# Patient Record
Sex: MALE | Race: BLACK OR AFRICAN AMERICAN
[De-identification: names, ages, dates, MRNs, and addresses within clinical notes are randomized per-mention and may not be internally consistent; named-entity substitution may affect disease eponyms.]

---

## 2017-02-04 NOTE — EMERGENCY ROOM REPORT
History of Present Illness


General


Chief Complaint:  Pain


Source:  Patient





Present Illness


HPI


Patient is a 56-year-old male who presented after having increased pain to his 

left arm as well as his chest.  Patient had gradual onset of symptoms.  Patient 

had no recent trauma.  Patient reports having increased numbness and weakness 

to his left upper extremity.  Patient denied prior chest pain the pain had been 

constant and worse with movements.  Patient was noted to have a burning type 

sensation to his upper extremities which did not go to his hands.


Allergies:  


Coded Allergies:  


     No Known Allergies (Unverified , 5/22/14)





Patient History


Past Medical History:  see triage record


Reviewed Nursing Documentation:  PMH: Agreed, PSxH: Agreed





Nursing Documentation-PMH


Hx Hypertension:  Yes





Review of Systems


All Other Systems:  negative except mentioned in HPI





Physical Exam





Vital Signs








  Date Time  Temp Pulse Resp B/P Pulse Ox O2 Delivery O2 Flow Rate FiO2


 


2/4/17 15:45 97.9 61 17 135/84 94 Room Air  








Sp02 EP Interpretation:  reviewed, normal


General Appearance:  normal inspection, well appearing, no apparent distress, 

alert, GCS 15, non-toxic


Head:  atraumatic


ENT:  normal ENT inspection, hearing grossly normal, normal voice


Neck:  normal inspection, supple, no bony tend, limited range of motion, tender 

lateral - increased pain with left rotation and compression


Respiratory:  normal inspection, lungs clear, normal breath sounds, no 

respiratory distress, no retraction, no wheezing


Cardiovascular #1:  regular rate, rhythm, no edema


Gastrointestinal:  normal inspection, normal bowel sounds, non tender, soft, no 

guarding, no hernia


Genitourinary:  no CVA tenderness


Musculoskeletal:  normal inspection, back normal, normal range of motion, other 

- limited ROM in extension, increased pain with left rotation


Neurologic:  normal inspection, alert, oriented x3, responsive, CNs III-XII nml 

as tested, motor strength/tone normal, DTRs symmetric, speech normal, sensory 

deficit


Psychiatric:  normal inspection, judgement/insight normal, mood/affect normal


Skin:  normal inspection, normal color, no rash





Medical Decision Making


Diagnostic Impression:  


 Primary Impression:  


 Radiculopathy of cervical spine


 Additional Impressions:  


 LVH (left ventricular hypertrophy)


 Abnormal EKG


ER Course


Patient presented for neck pain.Differential diagnosis included vertebral 

artery dissection, myocardial infarction, cervical fracture, arthritis, 

spondylolithises, pulmonary embolism.  


Because of complexity of patient's case laboratory testing and imaging studies 

were ordered.


A CT imaging of the cervical spine was ordered the patient's symptomatology.CT 

of chest was ordered due to patient's elevated d-dimer and the risk factors 

including prolonged driving as well as the abnormal EKG.  The patient was noted 

to have a lateral and inferior T wave inversion.  Patient states that he has 

had a cardiac catheterization approximately one year ago which showed no 

evidence of acute coronary blockage.  EKG interpreted by me showed normal sinus 

rhythm with a rate of 65 there is T-wave inversion as well as inferior T-wave 

inversion as well as left ventricular hypertrophy.   A CT of the chest read by 

radiologist showed no acute pulmonary embolism.  Patient was noted to have some 

degenerative changes cervical spine CT.  The patient is also noted to have some 

thyromegaly.  Patient was advised of CT findings and was advised followup with 

a spine specialist.  The patient is advised to follow up with  primary care 

doctor in 1-2 days.  Patient is advised to return if any worsening condition or 

if any changes in status that are concerning.





Labs








Test


  2/4/17


16:11


 


White Blood Count


  10.4 K/UL


(4.8-10.8)


 


Red Blood Count


  5.14 M/UL


(4.70-6.10)


 


Hemoglobin


  15.5 G/DL


(14.2-18.0)


 


Hematocrit


  49.2 %


(42.0-52.0)


 


Mean Corpuscular Volume 96 FL (80-99) 


 


Mean Corpuscular Hemoglobin


  30.2 PG


(27.0-31.0)


 


Mean Corpuscular Hemoglobin


Concent 31.6 G/DL


(32.0-36.0)


 


Red Cell Distribution Width


  12.8 %


(11.6-14.8)


 


Platelet Count


  195 K/UL


(150-450)


 


Mean Platelet Volume


  7.5 FL


(6.5-10.1)


 


Neutrophils (%) (Auto)


  52.4 %


(45.0-75.0)


 


Lymphocytes (%) (Auto)


  36.7 %


(20.0-45.0)


 


Monocytes (%) (Auto)


  8.2 %


(1.0-10.0)


 


Eosinophils (%) (Auto)


  1.9 %


(0.0-3.0)


 


Basophils (%) (Auto)


  0.9 %


(0.0-2.0)


 


D-Dimer


  518 ng/mL


(<500)


 


Sodium Level


  142 mEQ/L


(135-145)


 


Potassium Level


  4.0 mEQ/L


(3.4-4.9)


 


Chloride Level


  98 mEQ/L


()


 


Carbon Dioxide Level


  25 mEQ/L


(20-30)


 


Anion Gap 19 (5-15) 


 


Blood Urea Nitrogen


  13 mg/dL


(7-23)


 


Creatinine


  1.0 mg/dL


(0.7-1.2)


 


Estimat Glomerular Filtration


Rate > 60 mL/min


(>60)


 


Glucose Level


  100 mg/dL


()


 


Calcium Level


  10.3 mg/dL


(8.6-10.2)


 


Total Bilirubin


  0.5 mg/dL


(0.0-1.2)


 


Aspartate Amino Transf


(AST/SGOT) 22 U/L (5-40) 


 


 


Alanine Aminotransferase


(ALT/SGPT) 28 U/L (3-41) 


 


 


Alkaline Phosphatase


  102 U/L


()


 


Total Creatine Kinase


  430 U/L


()


 


Creatine Kinase MB


  3.1 ng/mL (<


6.7)


 


Creatine Kinase MB Relative


Index 0.7 


 


 


Troponin I


  < 0.30 ng/mL


(<=0.30)


 


Total Protein


  8.0 g/dL


(6.6-8.7)


 


Albumin


  4.3 g/dL


(3.5-5.2)


 


Globulin 3.7 g/dL 


 


Albumin/Globulin Ratio 1.1 (1.0-2.7) 








Chest X-Ray Diagnostic Results


EP Interpretation:  Yes


Findings:  no consolidation, no effusion, no pneumothorax, no acute 

cardiopulmonary disease


Number of Views:  1





Last Vital Signs








  Date Time  Temp Pulse Resp B/P Pulse Ox O2 Delivery O2 Flow Rate FiO2


 


2/4/17 15:45 97.9 61 17 135/84 94 Room Air  








Status:  improved


Disposition:  HOME, SELF-CARE


Condition:  Stable


Scripts


Gabapentin* (NEURONTIN*) 400 Mg Capsule


400 MG ORAL THREE TIMES A DAY, #30 CAP 0 Refills


   Prov: Kalen Benson         2/4/17











Kalen Benson Feb 4, 2017 16:09

## 2017-02-05 NOTE — DIAGNOSTIC IMAGING REPORT
HISTORY:  Neck pain.\H\



\N\COMPARISON:  None.

\H\

\N\TECHNIQUE: \H\ \N\Serial axial images were obtained through the cervical spine

without the use of intravenous contrast on a multidetector CT.  Multiplanar

reformatted images were then obtained.



FINDINGS:

Mild disc space height loss is noted at C5-C6 and C6-C7 levels. Vertebral body

heights and remaining disc spaces are preserved.  The lateral masses of C1 and C2

are well aligned.  The AA interval is normal.  There is no prevertebral or

paraspinal soft tissue swelling or other evidence of acute fracture, subluxation,

jumped facet, significant arthritic change or destructive bony lesion. Mild 

cervical

spondylosis with multilevel uncovertebral and facet arthropathy resulting in some

degree of neural foraminal stenosis, particularly at C3-C4 through C6-C7 levels

noted.



Opacification of the right sphenoid sinus is noted. Visualized thyroid gland 

appears

enlarged and heterogeneous.



IMPRESSION:



1. No acute cervical spine fracture or malalignment.

2. Mild cervical spondylosis.

3. Right sphenoid sinus opacification.

4. Thyromegaly. Please correlate with endocrinologic evaluation and thyroid 

function

tests.

## 2017-02-05 NOTE — DIAGNOSTIC IMAGING REPORT
History: Pain.



Technique: Frontal, lateral, and open-mouth odontoid views of the cervical spine 

are

provided.



Comparison: None.



Findings:

Overall cervical alignment is maintained.  Vertebral body and disk space heights 

are

preserved.  The lateral masses of C1 and C2 are well aligned.  The atlantoaxial

interval is normal.  There is no prevertebral soft tissue swelling or other evidence

of acute fracture.



Impression:

No radiographic evidence of acute displaced cervical spine fracture.

## 2017-02-05 NOTE — DIAGNOSTIC IMAGING REPORT
Clinical history: Acute chest of breath.



Technique: Portable AP chest radiograph was obtained.



Comparison: 5/22/14.



Findings:



The lungs are well inflated and clear. There is no pneumonia or pulmonary edema.

There is no pleural effusion or pneumothorax.  The cardiac and mediastinal

silhouettes are normal in appearance.  The bony thorax is unremarkable.



Impression:



No radiographic evidence of acute cardiopulmonary process..

## 2017-02-05 NOTE — CARDIOLOGY REPORT
--------------- APPROVED REPORT --------------





EKG Measurement

Heart [id number]

MT [id number]

[id number]

[id number]

[id number]





Normal sinus rhythm



Abnormal ECG

## 2017-02-07 NOTE — DIAGNOSTIC IMAGING REPORT
EXAM: CT Chest



HISTORY: Abnormal labs. Chest pain.



TECHNIQUE: Serial 3.0 mm axial images were obtained from the thoracic inlet through

the adrenals after administration of 120 cc of Isovue 370 . Multiplanar reformats

were performed.



COMPARISON: No prior study is available for comparison.



FINDINGS:



Mild subsegmental atelectasis or scarring is noted at the right lung base. No lobar

or segmental consolidation noted. No pleural effusion is seen. Secretions are noted

in the lower trachea and right mainstem bronchus.



The heart is not enlarged. There is no evidence of pericardial effusion. There is 

no

evidence of mediastinal, hilar or axillary adenopathy. The thyroid appears

unremarkable.



There is adequate opacification of the pulmonary arteries to the segmental level.

The subsegmental pulmonary arteries are not optimally evaluated. Within this

confine, no acute pulmonary embolism identified. Thoracic aorta and great vessels

are normal in caliber.



The visualized upper abdominal organs appear grossly normal.



Chronic fracture deformity of the left posterior fifth and sixth ribs noted.



IMPRESSION:



1. No distinct evidence of acute pulmonary embolism.

2. No evidence of pneumonia. Suspected right basilar atelectasis or scarring.

3. Chronic fracture deformity of the left posterior fifth and sixth ribs.

## 2018-07-30 NOTE — PRE-OP HX & PHY REPO 2 SIG
DATE OF ADMISSION:  08/01/2018



ANTICIPATED DATE OF SURGERY:  08/01/2018



HISTORY OF PRESENT ILLNESS:  The patient is a 58-year-old gentleman who I

initially evaluated in 12/2015, with symptomatic posterior vitreous

detachment and epiretinal membrane in his left eye.  He underwent a pars

plana vitrectomy with membrane peel at [facility] on May 23, 2018.

He has done well postoperatively.  He is now being scheduled for pars

plana vitrectomy for the right eye for symptomatic vitreous opacities.



PAST MEDICAL HISTORY:  Marked for hypercholesteremia, hypertension,

arthritis, asthma.  He is status post hand surgery and previous

vitrectomy.



MEDICATIONS:  Include Crestor, hydrochlorothiazide, amlodipine,

valsartan.



ALLERGIES:  He has no known drug allergies.



SOCIAL HISTORY:  Remarkable for occasional alcohol use.  He does not use

tobacco.



OPHTHALMIC EXAMINATION:  Visual acuity 20/20 right eye.  Tension is 18 in

the right and 17 in the left eye.  Slit-lamp demonstrated the conjunctiva

is quiet.  The cornea is clear.  The anterior chambers are quiet with 1+

nuclear sclerosis of the lenses of both eyes.  Dilated vitreoretinal

examination of right eye demonstrates clear media with cup-to-disc ratio

of 0.3.  There is a posterior vitreous detachment with opacities present

with some _____ and has flattened periphery.  Left eye media is clear,

status post vitrectomy.  There is mild thickening of macula, status post

recent membrane peel _____ flattened periphery.



IMPRESSION:  Symptomatic vitreous opacities in the right eye and posterior

vitreous detachment, right eye.



I discussed my findings with the patient.  He is having difficulties

performing his job duties as a  due to the opacities.

He states that this is causing him to feel unsafe at work and for this

reason, I will go ahead and recommend vitrectomy with possible membrane

peel to the right eye.  Complications such as hemorrhage, infection, loss

of vision, loss of eye, ptosis, glaucoma, strabismus, failure to obtain

desired results, cataract, dislocated lens, retinal detachment were all

discussed.  He understands and agrees to the surgery,.









  ______________________________________________

  Taras Germain M.D.





DR:  Abraham

D:  07/30/2018 08:16

T:  07/30/2018 10:21

JOB#:  [id number]

CC:

## 2018-08-01 ENCOUNTER — HOSPITAL ENCOUNTER (OUTPATIENT)
Dept: HOSPITAL 72 - SUR | Age: 58
Discharge: HOME | End: 2018-08-01
Payer: COMMERCIAL

## 2018-08-01 VITALS — SYSTOLIC BLOOD PRESSURE: 160 MMHG | DIASTOLIC BLOOD PRESSURE: 90 MMHG

## 2018-08-01 VITALS — SYSTOLIC BLOOD PRESSURE: 154 MMHG | DIASTOLIC BLOOD PRESSURE: 85 MMHG

## 2018-08-01 VITALS — WEIGHT: 240 LBS | BODY MASS INDEX: 30.8 KG/M2 | HEIGHT: 74 IN

## 2018-08-01 VITALS — DIASTOLIC BLOOD PRESSURE: 96 MMHG | SYSTOLIC BLOOD PRESSURE: 165 MMHG

## 2018-08-01 VITALS — DIASTOLIC BLOOD PRESSURE: 92 MMHG | SYSTOLIC BLOOD PRESSURE: 153 MMHG

## 2018-08-01 VITALS — SYSTOLIC BLOOD PRESSURE: 166 MMHG | DIASTOLIC BLOOD PRESSURE: 101 MMHG

## 2018-08-01 VITALS — DIASTOLIC BLOOD PRESSURE: 93 MMHG | SYSTOLIC BLOOD PRESSURE: 151 MMHG

## 2018-08-01 VITALS — DIASTOLIC BLOOD PRESSURE: 95 MMHG | SYSTOLIC BLOOD PRESSURE: 151 MMHG

## 2018-08-01 VITALS — SYSTOLIC BLOOD PRESSURE: 133 MMHG | DIASTOLIC BLOOD PRESSURE: 85 MMHG

## 2018-08-01 VITALS — DIASTOLIC BLOOD PRESSURE: 92 MMHG | SYSTOLIC BLOOD PRESSURE: 156 MMHG

## 2018-08-01 DIAGNOSIS — E66.9: ICD-10-CM

## 2018-08-01 DIAGNOSIS — E78.00: ICD-10-CM

## 2018-08-01 DIAGNOSIS — M19.90: ICD-10-CM

## 2018-08-01 DIAGNOSIS — J45.909: ICD-10-CM

## 2018-08-01 DIAGNOSIS — G47.33: ICD-10-CM

## 2018-08-01 DIAGNOSIS — I10: ICD-10-CM

## 2018-08-01 DIAGNOSIS — H43.391: Primary | ICD-10-CM

## 2018-08-01 PROCEDURE — 94150 VITAL CAPACITY TEST: CPT

## 2018-08-01 PROCEDURE — 94003 VENT MGMT INPAT SUBQ DAY: CPT

## 2018-08-01 PROCEDURE — 67036 REMOVAL OF INNER EYE FLUID: CPT

## 2018-08-01 RX ADMIN — Medication SCH DROP: at 10:46

## 2018-08-01 RX ADMIN — CYCLOPENTOLATE HYDROCHLORIDE SCH DROP: 10 SOLUTION OPHTHALMIC at 10:54

## 2018-08-01 RX ADMIN — PHENYLEPHRINE HYDROCHLORIDE SCH DROP: 25 SOLUTION/ DROPS OPHTHALMIC at 10:37

## 2018-08-01 RX ADMIN — CYCLOPENTOLATE HYDROCHLORIDE SCH DROP: 10 SOLUTION OPHTHALMIC at 10:46

## 2018-08-01 RX ADMIN — PHENYLEPHRINE HYDROCHLORIDE SCH DROP: 25 SOLUTION/ DROPS OPHTHALMIC at 10:54

## 2018-08-01 RX ADMIN — CYCLOPENTOLATE HYDROCHLORIDE SCH DROP: 10 SOLUTION OPHTHALMIC at 10:37

## 2018-08-01 RX ADMIN — PHENYLEPHRINE HYDROCHLORIDE SCH DROP: 25 SOLUTION/ DROPS OPHTHALMIC at 10:46

## 2018-08-01 RX ADMIN — Medication SCH DROP: at 10:37

## 2018-08-01 RX ADMIN — Medication SCH DROP: at 10:53

## 2018-08-01 NOTE — ANETHESIA PREOPERATIVE EVAL
Anesthesia Pre-op PMH/ROS


General


Date of Evaluation:  Aug 1, 2018


Anesthesiologist:  [place]


ASA Score:  ASA 2


Mallampati Score


Class I : Soft palate, uvula, fauces, pillars visible


Class II: Soft palate, uvula, fauces visible


Class III: Soft palate, base of uvula visible


Class IV: Only hard plate visible


Mallampati Classification:  Class II


Surgeon:  Marcellus


Diagnosis:  Right eye retinal detachment


Surgical Procedure:  Right eye vitrectomy


Anesthesia History:  none


Family History:  no anesthesia problems


Allergies:  


Coded Allergies:  


     No Known Allergies (Unverified , 7/31/18)


Medications:  see eMAR





Past Medical History


Cardiovascular:  Reports: HTN, other - HLD; 


   Denies: CAD, MI, valve dz, arrhythmia


Pulmonary:  Denies: asthma, COPD, AUGUST, other


Gastrointestinal/Genitourinary:  Denies: GERD, CRI, ESRD, other


Neurologic/Psychiatric:  Denies: dementia, CVA, depression/anxiety, TIA, other


Endocrine:  Denies: DM, hypothyroidism, steroids, other


HEENT:  Denies: cataract (L), cataract (R), glaucoma, [place] (L), [place] (R), other


Hematology/Immune:  Denies: anemia, DVT, bleeding disorder, other


Musculoskeletal/Integumentary:  Reports: OA; 


   Denies: RA, DJD, DDD, edema, other


Other:  obesity


PSxH Narrative:


left eye vitrectomy





Anesthesia Pre-op Phys. Exam


Physician Exam





Last Vital Signs








  Date Time  Temp Pulse Resp B/P (MAP) Pulse Ox O2 Delivery O2 Flow Rate FiO2


 


8/1/18 10:52 97.3 50 18 133/85 (101) 97   





 97.3       


 


8/1/18 10:42      Room Air  








Constitutional:  NAD


Cardiovascular:  RRR


Respiratory:  CTA





Airway Exam


Mallampati Score:  Class II


MO:  full


ROM:  full


Teeth:  intact





Anesthesia Pre-op A/P


Labs


see chart





Studies


Pre-op Studies:  EKG - sr





Risk Assessment & Plan


Assessment:


ASA II


Plan:


MAC


Status Change Before Surgery:  No





Pre-Antibiotics


Drug:  N/A











Andreea Izaguirre MD Aug 1, 2018 12:41

## 2018-08-01 NOTE — PRE-PROCEDURE NOTE/ATTESTATION
Pre-Procedure Note/Attestation


Complete Prior to Procedure


Planned Procedure:  right


Procedure Narrative:


pars plana vitrectomy





Indications for Procedure


Pre-Operative Diagnosis:


Posterior vitreous detachment with vitreous opacities





Attestation


I attest that I discussed the nature of the procedure; its benefits; risks and 

complications; and alternatives (and the risks and benefits of such alternatives

), prior to the procedure, with the patient (or the patient's legal 

representative).





I attest that, if there was a reasonable possibility of needing a blood 

transfusion, the patient (or the patient's legal representative) was given the 

[facility] standardized written summary, pursuant 

to the Markie Applegate Blood Safety Act ([place] Health and Safety Code # 1645, as 

amended).





I attest that I re-evaluated the patient just prior to the surgery and that 

there has been no change in the patient's H&P, except as documented below:











Taras Germain MD Aug 1, 2018 11:30

## 2018-08-01 NOTE — BRIEF OPERATIVE NOTE
Immediate Post Operative Note


Operative Note


Pre-op Diagnosis:


Posterior vitreous detachment with vitreous opacities


Procedure:


pars plana vitrectomy OD


Post-op Diagnosis:


same as pre op


Post-op Diagnosis:  same as pre-op


Anesthesia:  local, MAC


Specimen:  none


Complications:  none


Fluids:  o


Estimated Blood Loss:  none


Drains:  hemovac


Implant(s) used?:  No











Taras Germain MD Aug 1, 2018 13:26

## 2018-08-01 NOTE — 48 HOUR POST ANESTHESIA EVAL
Post Anesthesia Evaluation


Procedure:  Right eye vitrectomy


Date of Evaluation:  Aug 1, 2018


Airway:  patent


Nausea:  No


Vomiting:  No


Pain Intensity:  0


Hydration Status:  adequate


Cardiopulmonary Status:


at baseline


Mental Status/LOC:  patient returned to baseline


Post-Anesthesia Complications:


0


Follow-up care needed:  ready to discharge











Andreea Izaguirre MD Aug 1, 2018 13:00

## 2018-08-01 NOTE — IMMEDIATE POST-OP EVALUATION
Immediate Post-Op Evalulation


Immediate Post-Op Evalulation


Procedure:  Right eye vitrectomy


Date of Evaluation:  Aug 1, 2018


Time of Evaluation:  13:00


IV Fluids:  200


Blood Products:  0


Estimated Blood Loss:  0


Urinary Output:  0


Blood Pressure Systolic:  151


Blood Pressure Diastolic:  95


Pulse Rate:  51


Respiratory Rate:  12


O2 Sat by Pulse Oximetry:  98


Temperature (Fahrenheit):  97.4


Pain Score (1-10):  0


Nausea:  No


Vomiting:  No


Complications


0


Patient Status:  awake, reacts, patent, none


Hydration Status:  adequate


Drug:  N/A











Andreea Izaguirre MD Aug 1, 2018 13:00

## 2018-08-01 NOTE — OPERATIVE NOTE - DICTATED
DATE OF OPERATION:  08/01/2018



PREOPERATIVE DIAGNOSIS:  Vitreous opacities, right eye.



POSTOPERATIVE DIAGNOSIS:  Vitreous opacities, right eye.



PROCEDURE:  Pars plana vitrectomy, right eye.



SURGEON:  Taras Germain M.D. _____.



ANESTHESIA:  Local (4% lidocaine and 0.75% bupivacaine via retrobulbar

injection).



COMPLICATIONS:  None.



DESCRIPTION OF PROCEDURE:  After informed consent was obtained, clearance

by anesthesia, identification by Dr. Gemrain, the patient was brought to the

operating room where he received anesthetic injection.  The right eye was

prepped and draped in usual sterile ophthalmic fashion.  A wire lid

speculum was placed in conjunctiva fornix and a 3-port 27-gauge vitrectomy

was created, _____ trocars and cannulas placed, 4 mm posterior surgical

limbus supratemporally, supranasally, and inferotemporally.  Once the

infusion cannula was noted to be inside the eye, the infusion was turned

on.  The cortical vitreous then moved from the posterior surface of the

lens to the surface of the retina, which was excised to the vitreous base.

The eye was examined using scleral depression.  No holes or tears were

noted in the periphery.  The trocar and cannulas were removed.  The wounds

were noted to be watertight.  Subconjunctival cefazolin, Decadron and

topical atropine drops were placed.  The eye was patched and shielded.

The patient left the operating room in stable condition.









  ______________________________________________

  Taras Germain M.D.





DR:  BEA

D:  08/01/2018 13:23

T:  08/01/2018 18:38

JOB#:  [id number]

CC:

## 2019-03-03 ENCOUNTER — HOSPITAL ENCOUNTER (EMERGENCY)
Dept: HOSPITAL 72 - EMR | Age: 59
Discharge: HOME | End: 2019-03-03
Payer: COMMERCIAL

## 2019-03-03 VITALS — DIASTOLIC BLOOD PRESSURE: 69 MMHG | SYSTOLIC BLOOD PRESSURE: 129 MMHG

## 2019-03-03 VITALS — HEIGHT: 74 IN | BODY MASS INDEX: 32.73 KG/M2 | WEIGHT: 255 LBS

## 2019-03-03 DIAGNOSIS — J45.909: ICD-10-CM

## 2019-03-03 DIAGNOSIS — R00.2: Primary | ICD-10-CM

## 2019-03-03 DIAGNOSIS — I10: ICD-10-CM

## 2019-03-03 LAB
ADD MANUAL DIFF: NO
ALBUMIN SERPL-MCNC: 3.6 G/DL (ref 3.4–5)
ALBUMIN/GLOB SERPL: 0.9 {RATIO} (ref 1–2.7)
ALP SERPL-CCNC: 109 U/L (ref 46–116)
ALT SERPL-CCNC: 29 U/L (ref 12–78)
ANION GAP SERPL CALC-SCNC: 10 MMOL/L (ref 5–15)
AST SERPL-CCNC: 16 U/L (ref 15–37)
BASOPHILS NFR BLD AUTO: 0.8 % (ref 0–2)
BILIRUB SERPL-MCNC: 0.3 MG/DL (ref 0.2–1)
BUN SERPL-MCNC: 20 MG/DL (ref 7–18)
CALCIUM SERPL-MCNC: 9.4 MG/DL (ref 8.5–10.1)
CHLORIDE SERPL-SCNC: 104 MMOL/L (ref 98–107)
CK MB SERPL-MCNC: 2.6 NG/ML (ref 0–3.6)
CK SERPL-CCNC: 346 U/L (ref 26–308)
CO2 SERPL-SCNC: 28 MMOL/L (ref 21–32)
CREAT SERPL-MCNC: 1 MG/DL (ref 0.55–1.3)
EOSINOPHIL NFR BLD AUTO: 1.9 % (ref 0–3)
ERYTHROCYTE [DISTWIDTH] IN BLOOD BY AUTOMATED COUNT: 12.4 % (ref 11.6–14.8)
GLOBULIN SER-MCNC: 3.9 G/DL
HCT VFR BLD CALC: 42.2 % (ref 42–52)
HGB BLD-MCNC: 13.8 G/DL (ref 14.2–18)
LYMPHOCYTES NFR BLD AUTO: 41.6 % (ref 20–45)
MCV RBC AUTO: 95 FL (ref 80–99)
MONOCYTES NFR BLD AUTO: 8 % (ref 1–10)
NEUTROPHILS NFR BLD AUTO: 47.8 % (ref 45–75)
PLATELET # BLD: 146 K/UL (ref 150–450)
POTASSIUM SERPL-SCNC: 3.2 MMOL/L (ref 3.5–5.1)
RBC # BLD AUTO: 4.47 M/UL (ref 4.7–6.1)
SODIUM SERPL-SCNC: 142 MMOL/L (ref 136–145)
WBC # BLD AUTO: 9.1 K/UL (ref 4.8–10.8)

## 2019-03-03 PROCEDURE — 93005 ELECTROCARDIOGRAM TRACING: CPT

## 2019-03-03 PROCEDURE — 71045 X-RAY EXAM CHEST 1 VIEW: CPT

## 2019-03-03 PROCEDURE — 82550 ASSAY OF CK (CPK): CPT

## 2019-03-03 PROCEDURE — 85025 COMPLETE CBC W/AUTO DIFF WBC: CPT

## 2019-03-03 PROCEDURE — 99283 EMERGENCY DEPT VISIT LOW MDM: CPT

## 2019-03-03 PROCEDURE — 84484 ASSAY OF TROPONIN QUANT: CPT

## 2019-03-03 PROCEDURE — 36415 COLL VENOUS BLD VENIPUNCTURE: CPT

## 2019-03-03 PROCEDURE — 80053 COMPREHEN METABOLIC PANEL: CPT

## 2019-03-03 PROCEDURE — 82553 CREATINE MB FRACTION: CPT

## 2019-03-03 NOTE — NUR
ED Nurse Note:

Patient cleared for discharge, medication administered, patient vebalized 
understanding of discharge instructions. ID band removed, iV removed, patient 
ambulatory with steady gait. departed with all personal belongings accompanied 
by his girlfriend.

## 2019-03-03 NOTE — DIAGNOSTIC IMAGING REPORT
EXAM:

  XR Chest, 1 View

 

CLINICAL HISTORY:

  CP

 

TECHNIQUE:

  Frontal view of the chest.

 

COMPARISON:

  2-4-17

 

FINDINGS:

  Lungs:  Unremarkable.  No consolidation.

  Pleural space:  Unremarkable.  No pneumothorax.

  Heart:  Unremarkable.  No cardiomegaly.

  Mediastinum:  Unremarkable.

  Bones/joints: Old left rib fractures.

 

IMPRESSION:     

No acute findings substantial change

## 2019-03-03 NOTE — EMERGENCY ROOM REPORT
History of Present Illness


General


Chief Complaint:  Palpitations


Source:  Patient





Present Illness


HPI


58-year-old male presents ED for evaluation.  Patient complaining of 

palpitations.  Tates that for the last week and a half he's been feeling a 

skipped beat.  States he feels short of breath for that moment.  States he used 

to get these episodes occasionally but feels more persistent over the last week 

and a half.  Denies any chest pain or shortness of breath.  Notes history of 

hypertension.  States he is compliant with his medications.  Denies smoking or 

drug use.  No other aggravating relieving factors.  Denies any other associated 

symptoms


Allergies:  


Coded Allergies:  


     No Known Allergies (Unverified , 7/31/18)





Patient History


Past Medical History:  HTN


Past Surgical History:  none


Pertinent Family History:  none


Social History:  Denies: smoking, alcohol use, drug use


Immunizations:  UTD


Reviewed Nursing Documentation:  PMH: Agreed; PSxH: Agreed





Nursing Documentation-PMH


Hx Cardiac Problems:  Yes


Hx Hypertension:  Yes


Hx Asthma:  Yes


Hx Cancer:  No


Hx Gastrointestinal Problems:  No


Hx Neurological Problems:  No





Review of Systems


All Other Systems:  negative except mentioned in HPI





Physical Exam





Vital Signs








  Date Time  Temp Pulse Resp B/P (MAP) Pulse Ox O2 Delivery O2 Flow Rate FiO2


 


3/3/19 00:44 98.2 60 16 132/65 93 Room Air  








Sp02 EP Interpretation:  reviewed, normal


General Appearance:  no apparent distress, alert, GCS 15, non-toxic, obese


Head:  normocephalic, atraumatic


Eyes:  bilateral eye normal inspection, bilateral eye PERRL


ENT:  hearing grossly normal, normal pharynx, no angioedema, normal voice


Neck:  full range of motion, supple/symm/no masses


Respiratory:  chest non-tender, lungs clear, normal breath sounds, speaking 

full sentences


Cardiovascular #1:  regular rate, rhythm, no edema


Cardiovascular #2:  2+ carotid (R), 2+ carotid (L), 2+ radial (R), 2+ radial (L)

, 2+ dorsalis pedis (R), 2+ dorsalis pedis (L)


Gastrointestinal:  normal bowel sounds, non tender, soft, non-distended, no 

guarding, no rebound


Rectal:  deferred


Genitourinary:  normal inspection, no CVA tenderness


Musculoskeletal:  back normal, gait/station normal, normal range of motion, non-

tender


Neurologic:  alert, oriented x3, responsive, motor strength/tone normal, 

sensory intact, speech normal


Psychiatric:  judgement/insight normal, memory normal, mood/affect normal, no 

suicidal/homicidal ideation


Reflexes:  3+ bicep (R), 3+ bicep (L), 3+ tricep (R), 3+ tricep (L), 3+ knee (R)

, 3+ knee (L)


Skin:  normal color, no rash, warm/dry, well hydrated


Lymphatic:  no adenopathy





Medical Decision Making


Diagnostic Impression:  


 Primary Impression:  


 Palpitations


ER Course


Hospital Course 


58-year-old M presents ED complaining of palpitations





Differential diagnoses include: afib, Vtach, SVT, anxiety, dehydration





Clinical course


Patient placed on stretcher.  After initial history and physical I ordered labs

, EKG, chest x-ray, IVFs.  





labs reviewed- all electrolytes normal, troponins negative, no leukocytosis, 

hemoglobin/hematocrit stable


EKG - sinus bradycardia, PVCs noted


Chest x-ray-no cardiomegaly, no rib fracture, no pneumothorax, no acute process





During ED course patient had PVCs on the cardiac monitor.  Never persistent.  

Patient asymptomatic here.





Discussed findings with patient.  I believe patient can be safely discharged to 

home at this time.  Patient states he has a PMD he can follow-up with this week.





I.  I feel this is a highly complex case requiring extensive working including 

EKG/Rhythm strip, Xray/CT/US, Blood/urine lab work, repeat exams while in ED, 

and administration of strong opiates/narcotics for pain control, admission to 

hospital or close patient follow up.  





Diagnosis - palpitations





Stable and discharged to home.  Instructed to followup with PMD.  Return to ED 

if symptoms recur or worsen





Labs








Test


  3/3/19


01:13


 


White Blood Count


  9.1 K/UL


(4.8-10.8)


 


Red Blood Count


  4.47 M/UL


(4.70-6.10)


 


Hemoglobin


  13.8 G/DL


(14.2-18.0)


 


Hematocrit


  42.2 %


(42.0-52.0)


 


Mean Corpuscular Volume 95 FL (80-99) 


 


Mean Corpuscular Hemoglobin


  30.9 PG


(27.0-31.0)


 


Mean Corpuscular Hemoglobin


Concent 32.6 G/DL


(32.0-36.0)


 


Red Cell Distribution Width


  12.4 %


(11.6-14.8)


 


Platelet Count


  146 K/UL


(150-450)


 


Mean Platelet Volume


  7.7 FL


(6.5-10.1)


 


Neutrophils (%) (Auto)


  47.8 %


(45.0-75.0)


 


Lymphocytes (%) (Auto)


  41.6 %


(20.0-45.0)


 


Monocytes (%) (Auto)


  8.0 %


(1.0-10.0)


 


Eosinophils (%) (Auto)


  1.9 %


(0.0-3.0)


 


Basophils (%) (Auto)


  0.8 %


(0.0-2.0)


 


Sodium Level


  142 MMOL/L


(136-145)


 


Potassium Level


  3.2 MMOL/L


(3.5-5.1)


 


Chloride Level


  104 MMOL/L


()


 


Carbon Dioxide Level


  28 MMOL/L


(21-32)


 


Anion Gap


  10 mmol/L


(5-15)


 


Blood Urea Nitrogen


  20 mg/dL


(7-18)


 


Creatinine


  1.0 MG/DL


(0.55-1.30)


 


Estimat Glomerular Filtration


Rate > 60 mL/min


(>60)


 


Glucose Level


  103 MG/DL


()


 


Calcium Level


  9.4 MG/DL


(8.5-10.1)


 


Total Bilirubin


  0.3 MG/DL


(0.2-1.0)


 


Aspartate Amino Transf


(AST/SGOT) 16 U/L (15-37) 


 


 


Alanine Aminotransferase


(ALT/SGPT) 29 U/L (12-78) 


 


 


Alkaline Phosphatase


  109 U/L


()


 


Total Creatine Kinase


  346 U/L


()


 


Creatine Kinase MB


  2.6 NG/ML


(0.0-3.6)


 


Creatine Kinase MB Relative


Index 0.7 


 


 


Troponin I


  0.000 ng/mL


(0.000-0.056)


 


Total Protein


  7.5 G/DL


(6.4-8.2)


 


Albumin


  3.6 G/DL


(3.4-5.0)


 


Globulin 3.9 g/dL 


 


Albumin/Globulin Ratio 0.9 (1.0-2.7) 








EKG Diagnostic Results


Rate:  bradycardiac


Rhythm:  NSR


ST Segments:  other - PVCs


ASA given to the pt in ED:  No





Rhythm Strip Diag. Results


EP Interpretation:  yes


Rhythm:  other - PVCs noted





Chest X-Ray Diagnostic Results


Chest X-Ray Diagnostic Results :  


   Chest X-Ray Ordered:  Yes


   # of Views/Limited/Complete:  1 View


   Indication:  Other


   EP Interpretation:  Yes


   Interpretation:  no consolidation, no effusion, no pneumothorax, no acute 

cardiopulmonary disease


   Impression:  No acute disease


   Electronically Signed by:  Electronically signed by Paramjit Rice MD





Last Vital Signs








  Date Time  Temp Pulse Resp B/P (MAP) Pulse Ox O2 Delivery O2 Flow Rate FiO2


 


3/3/19 02:22 98.2  16 129/69 93 Room Air  


 


3/3/19 00:59  56      








Status:  improved


Disposition:  HOME, SELF-CARE


Condition:  Stable


Referrals:  


NON PHYSICIAN (PCP)


Patient Instructions:  Premature Ventricular Contraction











Paramjit Rice MD Mar 3, 2019 03:25

## 2019-03-06 NOTE — CARDIOLOGY REPORT
--------------- APPROVED REPORT --------------





EKG Measurement

Heart [id number]

ID [id number]

[id number]

[id number]

[id number]





Sinus bradycardia with occasional premature ventricular complexes



Abnormal ECG

## 2025-06-18 ENCOUNTER — OFFICE (OUTPATIENT)
Dept: URBAN - METROPOLITAN AREA CLINIC 106 | Facility: CLINIC | Age: 65
End: 2025-06-18

## 2025-06-18 DIAGNOSIS — R19.4 ALTERED BOWEL FUNCTION: ICD-10-CM

## 2025-06-18 DIAGNOSIS — Z90.49 HISTORY OF COLON RESECTION: ICD-10-CM

## 2025-06-18 DIAGNOSIS — Z85.038 PERSONAL HISTORY OF COLON CANCER: ICD-10-CM

## 2025-06-18 DIAGNOSIS — E78.00 HYPERCHOLESTEREMIA: ICD-10-CM

## 2025-06-18 DIAGNOSIS — Z12.11 SCREENING FOR COLON CANCER: ICD-10-CM

## 2025-06-18 DIAGNOSIS — Z86.0101 PERSONAL HISTORY OF ADENOMATOUS AND SERRATED COLON POLYPS: ICD-10-CM

## 2025-06-18 DIAGNOSIS — R14.1 BLOATING, GAS PAIN: ICD-10-CM

## 2025-06-18 DIAGNOSIS — I10 HYPERTENSION: ICD-10-CM

## 2025-06-18 PROCEDURE — 99204 OFFICE O/P NEW MOD 45 MIN: CPT | Performed by: INTERNAL MEDICINE

## 2025-06-18 NOTE — SERVICEHPINOTES
Presents for consideration of surveillance colonoscopy.    Prior exam was performed   4  years   ago at   unknown location or center  .    Colonoscopy findings included   colon polyps and colon cancer  .

## 2025-07-18 ENCOUNTER — AMBULATORY SURGICAL CENTER (OUTPATIENT)
Dept: URBAN - METROPOLITAN AREA SURGERY 68 | Facility: SURGERY | Age: 65
End: 2025-07-18

## 2025-07-18 DIAGNOSIS — K57.30 DIVERTICULOSIS OF LARGE INTESTINE WITHOUT PERFORATION OR ABS: ICD-10-CM

## 2025-07-18 DIAGNOSIS — Z12.11 ENCOUNTER FOR SCREENING FOR MALIGNANT NEOPLASM OF COLON: ICD-10-CM

## 2025-07-18 PROCEDURE — 45378 DIAGNOSTIC COLONOSCOPY: CPT | Performed by: INTERNAL MEDICINE

## 2025-07-28 ENCOUNTER — APPOINTMENT (OUTPATIENT)
Age: 65
Setting detail: DERMATOLOGY
End: 2025-07-28

## 2025-07-28 DIAGNOSIS — L30.9 DERMATITIS, UNSPECIFIED: ICD-10-CM | Status: WORSENING

## 2025-07-28 DIAGNOSIS — L259 CONTACT DERMATITIS AND OTHER ECZEMA, UNSPECIFIED CAUSE: ICD-10-CM

## 2025-07-28 PROCEDURE — ? PRESCRIPTION

## 2025-07-28 PROCEDURE — ? PRESCRIPTION MEDICATION MANAGEMENT

## 2025-07-28 PROCEDURE — ? BIOPSY BY PUNCH METHOD

## 2025-07-28 PROCEDURE — ? COUNSELING

## 2025-07-28 RX ORDER — TRIAMCINOLONE ACETONIDE 1 MG/G
CREAM TOPICAL BID
Qty: 454 | Refills: 0 | Status: ERX | COMMUNITY
Start: 2025-07-28

## 2025-07-28 RX ORDER — METHYLPREDNISOLONE 4 MG/1
TABLET ORAL
Qty: 1 | Refills: 0 | Status: ERX | COMMUNITY
Start: 2025-07-28

## 2025-07-28 RX ADMIN — METHYLPREDNISOLONE: 4 TABLET ORAL at 00:00

## 2025-07-28 RX ADMIN — TRIAMCINOLONE ACETONIDE: 1 CREAM TOPICAL at 00:00

## 2025-07-28 ASSESSMENT — LOCATION SIMPLE DESCRIPTION DERM
LOCATION SIMPLE: LEFT ELBOW
LOCATION SIMPLE: ABDOMEN
LOCATION SIMPLE: LEFT THIGH
LOCATION SIMPLE: LEFT LOWER BACK
LOCATION SIMPLE: RIGHT THIGH
LOCATION SIMPLE: RIGHT LOWER BACK
LOCATION SIMPLE: LEFT POSTERIOR THIGH
LOCATION SIMPLE: RIGHT UPPER ARM
LOCATION SIMPLE: RIGHT POSTERIOR THIGH

## 2025-07-28 ASSESSMENT — LOCATION DETAILED DESCRIPTION DERM
LOCATION DETAILED: RIGHT LATERAL ABDOMEN
LOCATION DETAILED: RIGHT PROXIMAL POSTERIOR UPPER ARM
LOCATION DETAILED: LEFT INFERIOR LATERAL MIDBACK
LOCATION DETAILED: RIGHT ANTERIOR PROXIMAL THIGH
LOCATION DETAILED: LEFT PROXIMAL POSTERIOR THIGH
LOCATION DETAILED: LEFT ELBOW
LOCATION DETAILED: LEFT RIB CAGE
LOCATION DETAILED: LEFT ANTERIOR PROXIMAL THIGH
LOCATION DETAILED: RIGHT PROXIMAL POSTERIOR THIGH
LOCATION DETAILED: RIGHT SUPERIOR LATERAL MIDBACK

## 2025-07-28 ASSESSMENT — LOCATION ZONE DERM
LOCATION ZONE: ARM
LOCATION ZONE: TRUNK
LOCATION ZONE: LEG

## 2025-07-28 NOTE — PROCEDURE: BIOPSY BY PUNCH METHOD
Body Location Override (Optional - Billing Will Still Be Based On Selected Body Map Location If Applicable): right flank
Detail Level: Simple
Was A Bandage Applied: Yes
Punch Size In Mm: 4
Size Of Lesion In Cm (Optional): 0
Depth Of Punch Biopsy: dermis
Biopsy Type: PAS
Anesthesia Type: 2% lidocaine with epinephrine
Anesthesia Volume In Cc: 1
Hemostasis: None
Epidermal Sutures: 5-0 Nylon
Wound Care: Bacitracin
Dressing: Band-Aid
Suture Removal: 14 days
Patient Will Remove Sutures At Home?: No
Lab: [id number]
Lab Facility: [id number]
Path Notes (To The Dermatopathologist): Size: 4 mm\\nPAS stain
Consent: Written consent was obtained and risks were reviewed including but not limited to scarring, infection, bleeding, scabbing, incomplete removal, nerve damage and allergy to anesthesia.
Post-Care Instructions: I reviewed with the patient in detail post-care instructions. Patient is to keep the biopsy site dry overnight, and then apply bacitracin twice daily until healed. Patient may apply hydrogen peroxide soaks to remove any crusting.
Home Suture Removal Text: Patient was provided a home suture removal kit and will remove their sutures at home.  If they have any questions or difficulties they will call the office.
Notification Instructions: Patient will be notified of biopsy results. However, patient instructed to call the office if not contacted within 2 weeks.
Billing Type: Third-Party Bill
Information: Selecting Yes will display possible errors in your note based on the variables you have selected. This validation is only offered as a suggestion for you. PLEASE NOTE THAT THE VALIDATION TEXT WILL BE REMOVED WHEN YOU FINALIZE YOUR NOTE. IF YOU WANT TO FAX A PRELIMINARY NOTE YOU WILL NEED TO TOGGLE THIS TO 'NO' IF YOU DO NOT WANT IT IN YOUR FAXED NOTE.
Body Location Override (Optional - Billing Will Still Be Based On Selected Body Map Location If Applicable): left chest

## 2025-07-28 NOTE — PROCEDURE: MIPS QUALITY
Quality 431: Preventive Care And Screening: Unhealthy Alcohol Use - Screening: Patient not identified as an unhealthy alcohol user when screened for unhealthy alcohol use using a systematic screening method
Quality 487: Screening For Social Drivers Of Health: Patient reason for not screening for food insecurity, housing instability, transportation needs, utility difficulties, and interpersonal safety (e.g., patient declined or other patient reasons)
Quality 226: Preventive Care And Screening: Tobacco Use: Screening And Cessation Intervention: Patient screened for tobacco use and is an ex/non-smoker
Detail Level: Zone

## 2025-08-04 ENCOUNTER — OFFICE (OUTPATIENT)
Dept: URBAN - METROPOLITAN AREA CLINIC 106 | Facility: CLINIC | Age: 65
End: 2025-08-04

## 2025-08-04 PROBLEM — K57.30 DIVERTICULOSIS OF LARGE INTESTINE WITHOUT PERFORATION OR ABS: Status: ACTIVE | Noted: 2025-07-18

## 2025-08-04 RX ORDER — HYDROCORTISONE ACETATE 25 MG/1
50 SUPPOSITORY RECTAL
Qty: 30 | Status: COMPLETED
Start: 2025-08-04 | End: 2025-08-04

## 2025-08-04 NOTE — SERVICEHPINOTES
Today the patient is following up after EGD/colonoscopy via Telehealth. Appointment was approximately 15 minutes long. Patient had some gas and bloating after the procedure but is doing well at this time.br The patient is scheduled today for  telehealth visit.The clinician is connected to a secure network. The patient consents to this teleconference being a billable service. This visit used [employer] for a live, interactive audio and video telehealth visit.

## 2025-08-04 NOTE — SERVICENOTES
The supervising physician reviewed the visit and agreed with the plan of care., The patient was offered a telehealth visit with synchronous audio-visual visit but requested audio only. Patient informed and consented to proceed. Received verbal consent from the patient to discuss personal health information in a telemedicine setting. Patient informed by staff that this consult may be subject to a copay and they may receive a bill if it applies. Patient confirms they are in the [facility].

## 2025-08-11 ENCOUNTER — APPOINTMENT (OUTPATIENT)
Age: 65
Setting detail: DERMATOLOGY
End: 2025-08-11

## 2025-08-11 DIAGNOSIS — L259 CONTACT DERMATITIS AND OTHER ECZEMA, UNSPECIFIED CAUSE: ICD-10-CM | Status: IMPROVED

## 2025-08-11 PROBLEM — L23.9 ALLERGIC CONTACT DERMATITIS, UNSPECIFIED CAUSE: Status: ACTIVE | Noted: 2025-08-11

## 2025-08-11 PROCEDURE — ? PRESCRIPTION MEDICATION MANAGEMENT

## 2025-08-11 PROCEDURE — ? COUNSELING

## 2025-08-11 PROCEDURE — ? PATHOLOGY DISCUSSION

## 2025-08-11 PROCEDURE — ? ADDITIONAL NOTES

## 2025-08-25 ENCOUNTER — APPOINTMENT (OUTPATIENT)
Age: 65
Setting detail: DERMATOLOGY
End: 2025-08-25

## 2025-08-25 DIAGNOSIS — L259 CONTACT DERMATITIS AND OTHER ECZEMA, UNSPECIFIED CAUSE: ICD-10-CM | Status: IMPROVED

## 2025-08-25 PROBLEM — L23.9 ALLERGIC CONTACT DERMATITIS, UNSPECIFIED CAUSE: Status: ACTIVE | Noted: 2025-08-25

## 2025-08-25 PROCEDURE — ? COUNSELING

## 2025-08-25 PROCEDURE — ? PRESCRIPTION

## 2025-08-25 PROCEDURE — ? PRESCRIPTION MEDICATION MANAGEMENT

## 2025-08-25 RX ORDER — TACROLIMUS 1 MG/G
OINTMENT TOPICAL
Qty: 100 | Refills: 2 | Status: ERX | COMMUNITY
Start: 2025-08-25

## 2025-08-25 RX ADMIN — TACROLIMUS: 1 OINTMENT TOPICAL at 00:00

## 2025-08-25 ASSESSMENT — LOCATION DETAILED DESCRIPTION DERM
LOCATION DETAILED: RIGHT LATERAL SUPERIOR CHEST
LOCATION DETAILED: LEFT LATERAL SUPERIOR CHEST
LOCATION DETAILED: RIGHT CLAVICULAR SKIN
LOCATION DETAILED: LEFT MEDIAL SUPERIOR CHEST
LOCATION DETAILED: RIGHT ANTERIOR PROXIMAL THIGH
LOCATION DETAILED: LEFT ANTERIOR PROXIMAL THIGH
LOCATION DETAILED: RIGHT LATERAL ABDOMEN

## 2025-08-25 ASSESSMENT — LOCATION SIMPLE DESCRIPTION DERM
LOCATION SIMPLE: ABDOMEN
LOCATION SIMPLE: RIGHT CLAVICULAR SKIN
LOCATION SIMPLE: RIGHT THIGH
LOCATION SIMPLE: LEFT THIGH
LOCATION SIMPLE: CHEST

## 2025-08-25 ASSESSMENT — LOCATION ZONE DERM
LOCATION ZONE: TRUNK
LOCATION ZONE: LEG